# Patient Record
Sex: MALE | Race: WHITE
[De-identification: names, ages, dates, MRNs, and addresses within clinical notes are randomized per-mention and may not be internally consistent; named-entity substitution may affect disease eponyms.]

---

## 2017-02-24 ENCOUNTER — HOSPITAL ENCOUNTER (EMERGENCY)
Dept: HOSPITAL 53 - M ED | Age: 47
Discharge: HOME | End: 2017-02-24
Payer: OTHER GOVERNMENT

## 2017-02-24 DIAGNOSIS — B02.9: Primary | ICD-10-CM

## 2017-02-24 DIAGNOSIS — Z79.899: ICD-10-CM

## 2017-02-24 DIAGNOSIS — F17.210: ICD-10-CM

## 2017-02-24 DIAGNOSIS — Z88.6: ICD-10-CM

## 2017-02-24 DIAGNOSIS — L30.9: ICD-10-CM

## 2017-02-24 DIAGNOSIS — I10: ICD-10-CM

## 2017-02-24 DIAGNOSIS — R01.1: ICD-10-CM

## 2017-02-24 NOTE — EDDOCDS
Physician Documentation                                                                           

[facility]                                                                         

Name: Chas Bermudez                                                                                  

Age: 46 yrs                                                                                       

Sex: Male                                                                                         

: 1970                                                                                   

MRN: [id number]                                                                                     

Arrival Date: 2017                                                                          

Time: 14:54                                                                                       

Account#: [id number]                                                                              

Bed TR5                                                                                           

Private MD: [facility]                                                                  

Disposition:                                                                                      

17 15:37 Discharged to Home/Self Care. Impression: Zoster [herpes zoster] - C7.             

- Condition is Stable.                                                                            

- Discharge Instructions: Shingles.                                                               

- Prescriptions for valacyclovir 1 gram Oral tablet - take 1 tablet by ORAL route 3               

times per day for 7 days; 20 tablet. Percocet 5- 325 mg Oral Tablet - take 1 tablet             

by ORAL route every 6 hours As needed MDD: 4 tabs; 20 tablet.                                   

- Medication Reconciliation form.                                                                 

- Follow up: Emergency Department; When: As needed; Reason: Worsening of conditions.              

Follow up: [facility]; When: Call to arrange an appointment; Reason:                  

Wound/Symptom Recheck, Recheck today's complaints, Continuance of care.                         

- Problem is new.                                                                                 

- Symptoms are unchanged.                                                                         

                                                                                                  

                                                                                                  

                                                                                                  

Historical:                                                                                       

- Allergies: meloxicam (swollen feet);                                                            

- Home Meds:                                                                                      

1. hydrochlorothiazide 12.5 mg Oral cap once daily                                              

2. triamcinolone acetonide 0.1 % Topical lotn prn                                               

3. vitamin b complex daily                                                                      

4. vitamin d 2000 unitys daily                                                                  

- PMHx: Hypertension; Eczema; Heart Murmur;                                                       

- PSHx: neck surgery; cyst removed from neck;                                                     

- Social history: Smoking status: Patient uses tobacco products, current every day                

smoker. No barriers to communication noted, The patient speaks fluent English, Speaks           

appropriately for age.                                                                          

- : The pt / caregiver states he / she is not on anticoagulants. Home medication list             

is obtained from the patient.                                                                   

- Exposure Risk Screening:: None identified.                                                      

                                                                                                  

                                                                                                  

Vital Signs:                                                                                      

                                                                                             

14:57  / 97; Pulse 80; Resp 18; Temp 98.6(T); Pulse Ox 97% on R/A; Weight 114.76 kg /   dem1

      253 lbs; Height 5 ft. 10 in. (177.80 cm); Pain 10/10;                                       

14:57 Body Mass Index 36.30 (114.76 kg, 177.80 cm)                                            dem1

                                                                                                  

MDM:                                                                                              

15:36 oxyCODONE-acetaminophen 5 mg-325 mg 1 tabs PO once ordered.                             cc10

15:36 valACYclovir 1000 mg PO once ordered.                                                   cc10

                                                                                                  

Administered Medications:                                                                         

15:45 Drug: oxyCODONE-acetaminophen 1 tabs [oxycodone-acetaminophen 5 mg-325 mg tablet (1     jjr 

      tabs)] Route: PO;                                                                           

15:45 Drug: valACYclovir 1000 mg [valacyclovir 500 mg tablet (2 tabs)] Route: PO;             jjr 

                                                                                                  

                                                                                                  

Signatures:                                                                                       

Graciela Kenny RN RN srm Raymond, Jessica, RN RN   jramar                                                  

Michael Bush, PARAMILA                    PA-MECCA cc10                                                 

                                                                                                  

**************************************************************************************************

MTDD

## 2017-02-24 NOTE — EDDOCDS
Nurse's Notes                                                                                     

[facility]                                                                         

Name: Chas Bermudez                                                                                  

Age: 46 yrs                                                                                       

Sex: Male                                                                                         

: 1970                                                                                   

MRN: [id number]                                                                                     

Arrival Date: 2017                                                                          

Time: 14:54                                                                                       

Account#: [id number]                                                                              

Bed TR5                                                                                           

[facility] MD: [facility]                                                                  

Diagnosis: Zoster [herpes zoster]-C7                                                              

                                                                                                  

Presentation:                                                                                     

                                                                                             

14:57 Presenting complaint: Patient states: tues afternoon started left neck pain and down    srm 

      left arm. hx of neck surgery in oct. no new injury. . pain up left side of neck when        

      moves arm. Risk Factors No acute neurological deficit is noted. Adult Sepsis Screening:     

      The patient does not have new or worsening altered mentation. Patient's respiratory         

      rate is less than 22. Systolic blood pressure is greater than 100. Patient has a qSOFA      

      score of 0- Negative Sepsis Screen. Suicide/Homicide risk assessment- the patient           

      denies having any suicidal and/or homicidal ideations and does not present with any         

      other emotional, behavioral or mental health complaints.  Status: Patient is        

      not a  or  dependent. Transition of care: patient was not             

      received from another setting of care.                                                      

14:57 Acuity: MELISSA Level 4                                                                     srm 

14:57 Method Of Arrival: Walkin/Carried/Asstd                                                 srm 

                                                                                                  

Triage Assessment:                                                                                

15:02 General: Appears in no apparent distress, Behavior is appropriate for age, cooperative. srm 

      Pain: Pain currently is 8 out of 10 on a pain scale. Musculoskeletal: Reports left neck     

      pain.                                                                                       

15:03 Pt Declines HIV testing.                                                                srm 

                                                                                                  

Historical:                                                                                       

- Allergies: meloxicam (swollen feet);                                                            

- Home Meds:                                                                                      

1. hydrochlorothiazide 12.5 mg Oral cap once daily                                              

2. triamcinolone acetonide 0.1 % Topical lotn prn                                               

3. vitamin b complex daily                                                                      

4. vitamin d 2000 unitys daily                                                                  

- PMHx: Hypertension; Eczema; Heart Murmur;                                                       

- PSHx: neck surgery; cyst removed from neck;                                                     

- Social history: Smoking status: Patient uses tobacco products, current every day                

smoker. No barriers to communication noted, The patient speaks fluent English, Speaks           

appropriately for age.                                                                          

- : The pt / caregiver states he / she is not on anticoagulants. Home medication list             

is obtained from the patient.                                                                   

- Exposure Risk Screening:: None identified.                                                      

                                                                                                  

                                                                                                  

Screening:                                                                                        

15:58 Screening information is obtained from the patient. Fall risk: No risks identified.     jjr 

      Assistance ADL's: requires no assistance with activities of daily living. Abuse/DV          

      Screen: The patient / caregiver reports he/she is: not in a situation that causes fear,     

      pain or injury. Nutritional screening: No deficits noted. Advance Directives: There is      

      no active DNR order. home support is adequate.                                              

                                                                                                  

Assessment:                                                                                       

15:57 General: Appears in no apparent distress, well nourished, well groomed, Behavior is     jjr 

      appropriate for age. Pain: Location: anterior aspect of left shoulder and dorsal aspect     

      of left forearm. Neurological: No deficits noted. Derm: Rash noted that is red, raised,     

      on thoracic area, anterior aspect of left shoulder and dorsal aspect of left forearm.       

      Musculoskeletal: reluctant to use left arm.                                                 

                                                                                                  

Vital Signs:                                                                                      

14:57  / 97; Pulse 80; Resp 18; Temp 98.6(T); Pulse Ox 97% on R/A; Weight 114.76 kg;    dem1

      Height 5 ft. 10 in. (177.80 cm); Pain 10/10;                                                

14:57 Body Mass Index 36.30 (114.76 kg, 177.80 cm)                                            [facility]

                                                                                                  

Vitals:                                                                                           

14:57 Log In Time: 2017 at 14:54.                                                dem1

                                                                                                  

ED Course:                                                                                        

14:55 Patient visited by Estela Hess.                                                     dem1

14:55 Patient moved to Waiting                                                                dem1

14:56 Hosea Perdomo PA is Private Physician.                                                  dem1

14:57 [facility] is Private Physician.                                              dem1

14:57 Patient moved to Pre RCE                                                                srm 

14:58 Triage Initiated                                                                        srm 

15:03 Patient moved to Triage 2                                                               srm 

15:29 [facility]                                                           cc[facility]

15:29 Oneil Valenzuela MD is Attending Physician.                                               cc10

15:29 Patient visited by Michael Bush PA-C.                                                cc10

15:29 Patient visited by Michael Bush PA-C.                                                cc10

15:37 [facility] is Referral Physician.                                             cc10

15:47 Patient moved to TR5                                                                    jjr 

15:58 The patient / caregiver is instructed regarding the plan of care and ED course.         jjr 

15:58 No IV's were initiated during this patient's visit. No procedures done that require     jjr 

      assistance.                                                                                 

                                                                                                  

Administered Medications:                                                                         

15:45 Drug: oxyCODONE-acetaminophen 1 tabs [oxycodone-acetaminophen 5 mg-325 mg tablet (1     jjr 

      tabs)] Route: PO;                                                                           

15:45 Drug: valACYclovir 1000 mg [valacyclovir 500 mg tablet (2 tabs)] Route: PO;             jjr 

                                                                                                  

                                                                                                  

Order Results:                                                                                    

There are currently no results for this order.                                                    

Outcome:                                                                                          

15:37 Discharge ordered by Provider.                                                          cc10

15:58 Discharge Assessment: patient administered narcotics - yes. Pt provided with safe       jjr 

      discharge. The following High Risk Discharge criteria are identified: None. Discharged      

      to home ambulatory, with significant other. Condition: stable. Discharge instructions       

      given to patient, Instructed on discharge instructions, follow up and referral plans.       

      medication usage, Demonstrated understanding of instructions, medications,                  

      Prescriptions given X 2. No special radiology studies were completed. Property sent         

      home with patient.                                                                          

15:59 Patient left the ED.                                                                    jjr 

                                                                                                  

Signatures:                                                                                       

Graciela Kenny RN                    Jeanette Taylor RN RN jjr Mack, Demeishia dem1 Coniski, Colin, PA-C PA-C cc10                                                 

                                                                                                  

**************************************************************************************************

[facility]

## 2017-02-26 NOTE — EDDOCDS
Nurse's Notes                                                                                     

[facility]                                                                         

Name: Chas Bermudez                                                                                  

Age: 46 yrs                                                                                       

Sex: Male                                                                                         

: 1970                                                                                   

MRN: [id number]                                                                                     

Arrival Date: 2017                                                                          

Time: 14:54                                                                                       

Account#: [id number]                                                                              

Bed TR5                                                                                           

Private MD: [facility] Scal[facility]                                                                  

Diagnosis: Zoster [herpes zoster]-C7                                                              

                                                                                                  

Presentation:                                                                                     

                                                                                             

14:57 Presenting complaint: Patient states: tues afternoon started left neck pain and down    srm 

      left arm. hx of neck surgery in oct. no new injury. . pain up left side of neck when        

      moves arm. Risk Factors No acute neurological deficit is noted. Adult Sepsis Screening:     

      The patient does not have new or worsening altered mentation. Patient's respiratory         

      rate is less than 22. Systolic blood pressure is greater than 100. Patient has a qSOFA      

      score of 0- Negative Sepsis Screen. Suicide/Homicide risk assessment- the patient           

      denies having any suicidal and/or homicidal ideations and does not present with any         

      other emotional, behavioral or mental health complaints.  Status: Patient is        

      not a  or  dependent. Transition of care: patient was not             

      received from another setting of care.                                                      

14:57 Acuity: MELISSA Level 4                                                                     srm 

14:57 Method Of Arrival: Walkin/Carried/Asstd                                                 srm 

                                                                                                  

Triage Assessment:                                                                                

15:02 General: Appears in no apparent distress, Behavior is appropriate for age, cooperative. srm 

      Pain: Pain currently is 8 out of 10 on a pain scale. Musculoskeletal: Reports left neck     

      pain.                                                                                       

15:03 Pt Declines HIV testing.                                                                srm 

                                                                                                  

Historical:                                                                                       

- Allergies: meloxicam (swollen feet);                                                            

- Home Meds:                                                                                      

1. hydrochlorothiazide 12.5 mg Oral cap once daily                                              

2. triamcinolone acetonide 0.1 % Topical lotn prn                                               

3. vitamin b complex daily                                                                      

4. vitamin d 2000 unitys daily                                                                  

- PMHx: Hypertension; Eczema; Heart Murmur;                                                       

- PSHx: neck surgery; cyst removed from neck;                                                     

- Social history: Smoking status: Patient uses tobacco products, current every day                

smoker. No barriers to communication noted, The patient speaks fluent English, Speaks           

appropriately for age.                                                                          

- : The pt / caregiver states he / she is not on anticoagulants. Home medication list             

is obtained from the patient.                                                                   

- Exposure Risk Screening:: None identified.                                                      

                                                                                                  

                                                                                                  

Screening:                                                                                        

15:58 Screening information is obtained from the patient. Fall risk: No risks identified.     jjr 

      Assistance ADL's: requires no assistance with activities of daily living. Abuse/DV          

      Screen: The patient / caregiver reports he/she is: not in a situation that causes fear,     

      pain or injury. Nutritional screening: No deficits noted. Advance Directives: There is      

      no active DNR order. home support is adequate.                                              

                                                                                                  

Assessment:                                                                                       

15:57 General: Appears in no apparent distress, well nourished, well groomed, Behavior is     jjr 

      appropriate for age. Pain: Location: anterior aspect of left shoulder and dorsal aspect     

      of left forearm. Neurological: No deficits noted. Derm: Rash noted that is red, raised,     

      on thoracic area, anterior aspect of left shoulder and dorsal aspect of left forearm.       

      Musculoskeletal: reluctant to use left arm.                                                 

                                                                                                  

Vital Signs:                                                                                      

14:57  / 97; Pulse 80; Resp 18; Temp 98.6(T); Pulse Ox 97% on R/A; Weight 114.76 kg;    dem1

      Height 5 ft. 10 in. (177.80 cm); Pain 10/10;                                                

14:57 Body Mass Index 36.30 (114.76 kg, 177.80 cm)                                            [facility]

                                                                                                  

Vitals:                                                                                           

14:57 Log In Time: 2017 at 14:54.                                                dem1

                                                                                                  

ED Course:                                                                                        

14:55 Patient visited by Estela Hess.                                                     dem1

14:55 Patient moved to Waiting                                                                dem1

14:56 Hosea Perdomo PA is Private Physician.                                                  dem1

14:57 [facility] is Private Physician.                                              dem1

14:57 Patient moved to Pre RCE                                                                srm 

14:58 Triage Initiated                                                                        srm 

15:03 Patient moved to Triage 2                                                               srm 

15:29 [facility]                                                           cc[facility]

15:29 Oneil Valenzuela MD is Attending Physician.                                               cc10

15:29 Patient visited by Michael Bush PA-C.                                                cc10

15:29 Patient visited by Michael Bush PA-C.                                                cc10

15:37 [facility] is Referral Physician.                                             cc10

15:47 Patient moved to TR5                                                                    jjr 

15:58 The patient / caregiver is instructed regarding the plan of care and ED course.         jjr 

15:58 No IV's were initiated during this patient's visit. No procedures done that require     jjr 

      assistance.                                                                                 

17:50 T-Sheet-- Draft Copy was scanned into [employer] and attached to record.                   heather 

                                                                                             

07:53 [facility] Payment Agreement was scanned into [employer] and attached to record.               lg  

                                                                                                  

Administered Medications:                                                                         

                                                                                             

15:45 Drug: oxyCODONE-acetaminophen 1 tabs [oxycodone-acetaminophen 5 mg-325 mg tablet (1     jjr 

      tabs)] Route: PO;                                                                           

15:45 Drug: valACYclovir 1000 mg [valacyclovir 500 mg tablet (2 tabs)] Route: PO;             jjr 

                                                                                                  

                                                                                                  

Order Results:                                                                                    

There are currently no results for this order.                                                    

Outcome:                                                                                          

15:37 Discharge ordered by Provider.                                                          cc10

15:58 Discharge Assessment: patient administered narcotics - yes. Pt provided with safe       jjr 

      discharge. The following High Risk Discharge criteria are identified: None. Discharged      

      to home ambulatory, with significant other. Condition: stable. Discharge instructions       

      given to patient, Instructed on discharge instructions, follow up and referral plans.       

      medication usage, Demonstrated understanding of instructions, medications,                  

      Prescriptions given X 2. No special radiology studies were completed. Property sent         

      home with patient.                                                                          

15:59 Patient left the ED.                                                                    jjr 

                                                                                                  

Signatures:                                                                                       

Graciela Kenny, RN                    RN   srm                                                  

Casey Thao, Reg                    Reg  lg                                                   

Jeanette Avendano RN                    RN   jjr                                                  

Estela Hess Colin, PA-C                    PARAMILA cc10                                                 

Karina Forrester                                                  

                                                                                                  

**************************************************************************************************



*** Chart Complete ***
MTDD

## 2017-02-26 NOTE — EDDOCDS
Physician Documentation                                                                           

[facility]                                                                         

Name: Chas Bermudez                                                                                  

Age: 46 yrs                                                                                       

Sex: Male                                                                                         

: 1970                                                                                   

MRN: [id number]                                                                                     

Arrival Date: 2017                                                                          

Time: 14:54                                                                                       

Account#: [id number]                                                                              

Bed TR5                                                                                           

Private MD: [facility]                                                                  

Disposition:                                                                                      

17 15:37 Discharged to Home/Self Care. Impression: Zoster [herpes zoster] - C7.             

- Condition is Stable.                                                                            

- Discharge Instructions: Shingles.                                                               

- Prescriptions for valacyclovir 1 gram Oral tablet - take 1 tablet by ORAL route 3               

times per day for 7 days; 20 tablet. Percocet 5- 325 mg Oral Tablet - take 1 tablet             

by ORAL route every 6 hours As needed MDD: 4 tabs; 20 tablet.                                   

- Medication Reconciliation form.                                                                 

- Follow up: Emergency Department; When: As needed; Reason: Worsening of conditions.              

Follow up: [facility]; When: Call to arrange an appointment; Reason:                  

Wound/Symptom Recheck, Recheck today's complaints, Continuance of care.                         

- Problem is new.                                                                                 

- Symptoms are unchanged.                                                                         

                                                                                                  

                                                                                                  

                                                                                                  

Historical:                                                                                       

- Allergies: meloxicam (swollen feet);                                                            

- Home Meds:                                                                                      

1. hydrochlorothiazide 12.5 mg Oral cap once daily                                              

2. triamcinolone acetonide 0.1 % Topical lotn prn                                               

3. vitamin b complex daily                                                                      

4. vitamin d 2000 unitys daily                                                                  

- PMHx: Hypertension; Eczema; Heart Murmur;                                                       

- PSHx: neck surgery; cyst removed from neck;                                                     

- Social history: Smoking status: Patient uses tobacco products, current every day                

smoker. No barriers to communication noted, The patient speaks fluent English, Speaks           

appropriately for age.                                                                          

- : The pt / caregiver states he / she is not on anticoagulants. Home medication list             

is obtained from the patient.                                                                   

- Exposure Risk Screening:: None identified.                                                      

                                                                                                  

                                                                                                  

Vital Signs:                                                                                      

                                                                                             

14:57  / 97; Pulse 80; Resp 18; Temp 98.6(T); Pulse Ox 97% on R/A; Weight 114.76 kg /   dem1

      253 lbs; Height 5 ft. 10 in. (177.80 cm); Pain 10/10;                                       

14:57 Body Mass Index 36.30 (114.76 kg, 177.80 cm)                                            dem1

                                                                                                  

MDM:                                                                                              

15:36 oxyCODONE-acetaminophen 5 mg-325 mg 1 tabs PO once ordered.                             cc10

15:36 valACYclovir 1000 mg PO once ordered.                                                   cc10

17:50 T-Sheet-- Draft Copy was scanned into [employer] and attached to record.                   [facility] 

                                                                                             

07:53 [facility] Payment Agreement was scanned into [employer] and attached to record.               lg  

                                                                                                  

Administered Medications:                                                                         

                                                                                             

15:45 Drug: oxyCODONE-acetaminophen 1 tabs [oxycodone-acetaminophen 5 mg-325 mg tablet (1     jjr 

      tabs)] Route: PO;                                                                           

15:45 Drug: valACYclovir 1000 mg [valacyclovir 500 mg tablet (2 tabs)] Route: PO;             jjr 

                                                                                                  

                                                                                                  

Signatures:                                                                                       

Graciela Kenny, RN                    Casey Jay Reg Reg  lg                                                   

Jeanette Avendano RN RN   jjr                                                  

Michael Bush, MCKAY                    PARAMILA cc10                                                 

Karina Forrester                                                  

                                                                                                  

The chart was reviewed and I authenticate all verbal orders and agree with the evaluation and 
treatment provided.Attachments:

17:50 T-Sheet-- Draft Copy                                                                    [facility] 

                                                                                             

07:53 [facility] Payment Agreement                                                                lg  

                                                                                                  

**************************************************************************************************



*** Chart Complete ***
MTDD

## 2017-02-26 NOTE — EDDOCDS
Physician Documentation                                                                           

[facility]                                                                         

Name: Chas Bermudez                                                                                  

Age: 46 yrs                                                                                       

Sex: Male                                                                                         

: 1970                                                                                   

MRN: [id number]                                                                                     

Arrival Date: 2017                                                                          

Time: 14:54                                                                                       

Account#: [id number]                                                                              

Bed TR5                                                                                           

Private MD: [facility]                                                                  

Disposition:                                                                                      

17 15:37 Discharged to Home/Self Care. Impression: Zoster [herpes zoster] - C7.             

- Condition is Stable.                                                                            

- Discharge Instructions: Shingles.                                                               

- Prescriptions for valacyclovir 1 gram Oral tablet - take 1 tablet by ORAL route 3               

times per day for 7 days; 20 tablet. Percocet 5- 325 mg Oral Tablet - take 1 tablet             

by ORAL route every 6 hours As needed MDD: 4 tabs; 20 tablet.                                   

- Medication Reconciliation form.                                                                 

- Follow up: Emergency Department; When: As needed; Reason: Worsening of conditions.              

Follow up: [facility]; When: Call to arrange an appointment; Reason:                  

Wound/Symptom Recheck, Recheck today's complaints, Continuance of care.                         

- Problem is new.                                                                                 

- Symptoms are unchanged.                                                                         

                                                                                                  

                                                                                                  

                                                                                                  

Historical:                                                                                       

- Allergies: meloxicam (swollen feet);                                                            

- Home Meds:                                                                                      

1. hydrochlorothiazide 12.5 mg Oral cap once daily                                              

2. triamcinolone acetonide 0.1 % Topical lotn prn                                               

3. vitamin b complex daily                                                                      

4. vitamin d 2000 unitys daily                                                                  

- PMHx: Hypertension; Eczema; Heart Murmur;                                                       

- PSHx: neck surgery; cyst removed from neck;                                                     

- Social history: Smoking status: Patient uses tobacco products, current every day                

smoker. No barriers to communication noted, The patient speaks fluent English, Speaks           

appropriately for age.                                                                          

- : The pt / caregiver states he / she is not on anticoagulants. Home medication list             

is obtained from the patient.                                                                   

- Exposure Risk Screening:: None identified.                                                      

                                                                                                  

                                                                                                  

Vital Signs:                                                                                      

                                                                                             

14:57  / 97; Pulse 80; Resp 18; Temp 98.6(T); Pulse Ox 97% on R/A; Weight 114.76 kg /   dem1

      253 lbs; Height 5 ft. 10 in. (177.80 cm); Pain 10/10;                                       

14:57 Body Mass Index 36.30 (114.76 kg, 177.80 cm)                                            dem1

                                                                                                  

MDM:                                                                                              

15:36 oxyCODONE-acetaminophen 5 mg-325 mg 1 tabs PO once ordered.                             cc10

15:36 valACYclovir 1000 mg PO once ordered.                                                   cc10

17:50 T-Sheet-- Draft Copy was scanned into [facility] and attached to record.                   [facility] 

                                                                                             

07:53 [facility] Payment Agreement was scanned into [facility] and attached to record.               lg  

                                                                                                  

Administered Medications:                                                                         

                                                                                             

15:45 Drug: oxyCODONE-acetaminophen 1 tabs [oxycodone-acetaminophen 5 mg-325 mg tablet (1     jjr 

      tabs)] Route: PO;                                                                           

15:45 Drug: valACYclovir 1000 mg [valacyclovir 500 mg tablet (2 tabs)] Route: PO;             jjr 

                                                                                                  

                                                                                                  

Signatures:                                                                                       

Graciela Kenny, RN                    [facility]  lg                                                   

Jeanette Avendano RN RN   jjr                                                  

Michael Bush, MCKAY                    PARAMILA cc10                                                 

Karina Forrester                                                  

                                                                                                  

The chart was reviewed and I authenticate all verbal orders and agree with the evaluation and 
treatment provided.Attachments:

17:50 T-Sheet-- Draft Copy                                                                    [facility] 

                                                                                             

07:53 [facility] Payment Agreement                                                                lg  

                                                                                                  

**************************************************************************************************



*** Chart Complete ***
MTDD

## 2020-07-01 ENCOUNTER — HOSPITAL ENCOUNTER (OUTPATIENT)
Dept: HOSPITAL 53 - M LAB | Age: 50
End: 2020-07-01
Attending: PODIATRIST
Payer: OTHER GOVERNMENT

## 2020-07-01 DIAGNOSIS — M79.672: ICD-10-CM

## 2020-07-01 DIAGNOSIS — R00.0: ICD-10-CM

## 2020-07-01 DIAGNOSIS — M20.12: Primary | ICD-10-CM

## 2020-07-01 LAB
ALBUMIN SERPL BCG-MCNC: 4.3 GM/DL (ref 3.2–5.2)
ALT SERPL W P-5'-P-CCNC: 32 U/L (ref 12–78)
BASOPHILS # BLD AUTO: 0.1 10^3/UL (ref 0–0.2)
BASOPHILS NFR BLD AUTO: 0.9 % (ref 0–1)
BILIRUB SERPL-MCNC: 0.6 MG/DL (ref 0.2–1)
BUN SERPL-MCNC: 12 MG/DL (ref 7–18)
CALCIUM SERPL-MCNC: 9.3 MG/DL (ref 8.5–10.1)
CHLORIDE SERPL-SCNC: 108 MEQ/L (ref 98–107)
CO2 SERPL-SCNC: 29 MEQ/L (ref 21–32)
CREAT SERPL-MCNC: 1.08 MG/DL (ref 0.7–1.3)
EOSINOPHIL # BLD AUTO: 0.2 10^3/UL (ref 0–0.5)
EOSINOPHIL NFR BLD AUTO: 2.7 % (ref 0–3)
GFR SERPL CREATININE-BSD FRML MDRD: > 60 ML/MIN/{1.73_M2} (ref 60–?)
GLUCOSE SERPL-MCNC: 68 MG/DL (ref 70–100)
HCT VFR BLD AUTO: 46.3 % (ref 42–52)
HGB BLD-MCNC: 15.3 G/DL (ref 13.5–17.5)
LYMPHOCYTES # BLD AUTO: 3.3 10^3/UL (ref 1.5–5)
LYMPHOCYTES NFR BLD AUTO: 42.7 % (ref 24–44)
MCH RBC QN AUTO: 29.1 PG (ref 27–33)
MCHC RBC AUTO-ENTMCNC: 33 G/DL (ref 32–36.5)
MCV RBC AUTO: 88 FL (ref 80–96)
MONOCYTES # BLD AUTO: 0.9 10^3/UL (ref 0–0.8)
MONOCYTES NFR BLD AUTO: 11.6 % (ref 0–5)
NEUTROPHILS # BLD AUTO: 3.2 10^3/UL (ref 1.5–8.5)
NEUTROPHILS NFR BLD AUTO: 41.7 % (ref 36–66)
PLATELET # BLD AUTO: 226 10^3/UL (ref 150–450)
POTASSIUM SERPL-SCNC: 4.3 MEQ/L (ref 3.5–5.1)
PROT SERPL-MCNC: 7.2 GM/DL (ref 6.4–8.2)
RBC # BLD AUTO: 5.26 10^6/UL (ref 4.3–6.1)
SODIUM SERPL-SCNC: 141 MEQ/L (ref 136–145)
WBC # BLD AUTO: 7.7 10^3/UL (ref 4–10)

## 2020-07-02 NOTE — ECGEPIP
[facility]

                                       

                                       Test Date:    2020

Pat Name:     FAY LANDEROS               Department:   

Patient ID:   [id number]                 Room:         -

Gender:       Male                     Technician:   ANNIE

:          1970               Requested By: Dallas Nair 

Order Number: [id number]     Reading MD:   Jarvis Henderson

                                 Measurements

Intervals                              Axis          

Rate:         41                       P:            58

[place]:           166                      QRS:          -3

QRSD:         114                      T:            18

QT:           444                                    

QTc:          369                                    

                           Interpretive Statements

SINUS BRADYCARDIA

SIMILAR TO 10/11/16, HR IS EVEN SLOWER ON CURRENT EKG

Electronically Signed on 2020 8:45:21 EDT by Jarvis Henderson

## 2020-07-10 ENCOUNTER — HOSPITAL ENCOUNTER (OUTPATIENT)
Dept: HOSPITAL 53 - M SDC | Age: 50
Discharge: HOME | End: 2020-07-10
Attending: PODIATRIST
Payer: OTHER GOVERNMENT

## 2020-07-10 VITALS — SYSTOLIC BLOOD PRESSURE: 113 MMHG | DIASTOLIC BLOOD PRESSURE: 60 MMHG

## 2020-07-10 VITALS — WEIGHT: 235 LBS | HEIGHT: 70 IN | BODY MASS INDEX: 33.64 KG/M2

## 2020-07-10 DIAGNOSIS — Z79.899: ICD-10-CM

## 2020-07-10 DIAGNOSIS — I10: ICD-10-CM

## 2020-07-10 DIAGNOSIS — Z88.8: ICD-10-CM

## 2020-07-10 DIAGNOSIS — M20.12: Primary | ICD-10-CM

## 2020-07-10 DIAGNOSIS — G47.30: ICD-10-CM

## 2020-07-10 PROCEDURE — 97116 GAIT TRAINING THERAPY: CPT

## 2020-07-10 PROCEDURE — 28297 COR HLX VLGS JT ARTHRD: CPT

## 2020-07-10 PROCEDURE — 87798 DETECT AGENT NOS DNA AMP: CPT

## 2020-07-10 PROCEDURE — 87486 CHLMYD PNEUM DNA AMP PROBE: CPT

## 2020-07-10 PROCEDURE — 73630 X-RAY EXAM OF FOOT: CPT

## 2020-07-10 PROCEDURE — 87581 M.PNEUMON DNA AMP PROBE: CPT

## 2020-07-10 PROCEDURE — 76000 FLUOROSCOPY <1 HR PHYS/QHP: CPT

## 2020-07-10 PROCEDURE — 88300 SURGICAL PATH GROSS: CPT

## 2020-07-10 PROCEDURE — 87633 RESP VIRUS 12-25 TARGETS: CPT

## 2020-07-10 NOTE — REP
LEFT FOOT THREE VIEWS:

 

Three views of the left foot performed.

 

Metallic plate and multiple screws bridge the medial cuneiform bone and the base

of the 1st metatarsal.  Osseous structures are well aligned.  Overlying cast

obscures underlying osseous detail.

 

 

Electronically Signed by

Дмитрий Franco MD 07/12/2020 11:05 P

## 2020-07-10 NOTE — REP
C-ARM VIEWS, LEFT FOOT:

 

Two C-arm views left foot performed.

 

There is a metallic plate and multiple screws measuring the medial cuneiform and

base of 1st metatarsal. The osseous structures appear well aligned.

 

21 seconds of fluoroscopy time utilized.

 

 

Electronically Signed by

Дмитрий Franco MD 07/12/2020 10:59 P